# Patient Record
Sex: FEMALE | Race: WHITE
[De-identification: names, ages, dates, MRNs, and addresses within clinical notes are randomized per-mention and may not be internally consistent; named-entity substitution may affect disease eponyms.]

---

## 2018-12-23 ENCOUNTER — HOSPITAL ENCOUNTER (EMERGENCY)
Dept: HOSPITAL 94 - ER | Age: 48
Discharge: HOME | End: 2018-12-23
Payer: MEDICAID

## 2018-12-23 VITALS — WEIGHT: 182.98 LBS | HEIGHT: 60 IN | BODY MASS INDEX: 35.92 KG/M2

## 2018-12-23 VITALS — SYSTOLIC BLOOD PRESSURE: 129 MMHG | DIASTOLIC BLOOD PRESSURE: 76 MMHG
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
## 2018-12-23 DIAGNOSIS — Z79.899: ICD-10-CM
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
## 2018-12-23 DIAGNOSIS — S62.602B: Primary | ICD-10-CM

## 2018-12-23 DIAGNOSIS — I10: ICD-10-CM

## 2018-12-23 DIAGNOSIS — Z88.0: ICD-10-CM
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
## 2018-12-23 PROCEDURE — 29130 APPL FINGER SPLINT STATIC: CPT

## 2018-12-23 PROCEDURE — 99283 EMERGENCY DEPT VISIT LOW MDM: CPT

## 2018-12-23 PROCEDURE — 73110 X-RAY EXAM OF WRIST: CPT

## 2018-12-23 PROCEDURE — 73090 X-RAY EXAM OF FOREARM: CPT

## 2018-12-23 PROCEDURE — 73130 X-RAY EXAM OF HAND: CPT

## 2018-12-23 PROCEDURE — 12002 RPR S/N/AX/GEN/TRNK2.6-7.5CM: CPT

## 2022-06-10 ENCOUNTER — HOSPITAL ENCOUNTER (EMERGENCY)
Dept: HOSPITAL 94 - ER | Age: 52
Discharge: HOME | End: 2022-06-10
Payer: MEDICAID

## 2022-06-10 VITALS — SYSTOLIC BLOOD PRESSURE: 124 MMHG | DIASTOLIC BLOOD PRESSURE: 84 MMHG

## 2022-06-10 VITALS — BODY MASS INDEX: 34.19 KG/M2 | WEIGHT: 174.17 LBS | HEIGHT: 60 IN

## 2022-06-10 DIAGNOSIS — Z88.0: ICD-10-CM

## 2022-06-10 DIAGNOSIS — F15.90: ICD-10-CM
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
## 2022-06-10 DIAGNOSIS — Z79.899: ICD-10-CM

## 2022-06-10 DIAGNOSIS — M54.50: Primary | ICD-10-CM
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
## 2022-06-10 DIAGNOSIS — Z59.00: ICD-10-CM

## 2022-06-10 DIAGNOSIS — I10: ICD-10-CM

## 2022-06-10 PROCEDURE — 72131 CT LUMBAR SPINE W/O DYE: CPT

## 2022-06-10 PROCEDURE — 99284 EMERGENCY DEPT VISIT MOD MDM: CPT

## 2022-06-10 SDOH — ECONOMIC STABILITY - HOUSING INSECURITY: HOMELESSNESS UNSPECIFIED: Z59.00
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
## 2022-07-18 ENCOUNTER — HOSPITAL ENCOUNTER (EMERGENCY)
Dept: HOSPITAL 94 - ER | Age: 52
Discharge: HOME | End: 2022-07-18
Payer: MEDICAID

## 2022-07-18 VITALS — SYSTOLIC BLOOD PRESSURE: 144 MMHG | DIASTOLIC BLOOD PRESSURE: 94 MMHG

## 2022-07-18 VITALS — BODY MASS INDEX: 33.44 KG/M2 | HEIGHT: 60 IN | WEIGHT: 170.35 LBS

## 2022-07-18 DIAGNOSIS — F15.10: ICD-10-CM

## 2022-07-18 DIAGNOSIS — Z79.899: ICD-10-CM

## 2022-07-18 DIAGNOSIS — Z88.0: ICD-10-CM

## 2022-07-18 DIAGNOSIS — I10: ICD-10-CM

## 2022-07-18 DIAGNOSIS — Z59.00: ICD-10-CM

## 2022-07-18 DIAGNOSIS — M54.9: Primary | ICD-10-CM

## 2022-07-18 PROCEDURE — 99281 EMR DPT VST MAYX REQ PHY/QHP: CPT

## 2022-07-18 SDOH — ECONOMIC STABILITY - HOUSING INSECURITY: HOMELESSNESS UNSPECIFIED: Z59.00

## 2022-08-02 ENCOUNTER — HOSPITAL ENCOUNTER (EMERGENCY)
Dept: HOSPITAL 94 - ER | Age: 52
Discharge: LEFT BEFORE DISCHARGE | End: 2022-08-02
Payer: MEDICAID

## 2022-08-02 VITALS — WEIGHT: 150.31 LBS | BODY MASS INDEX: 29.51 KG/M2 | HEIGHT: 60 IN

## 2022-08-02 VITALS — DIASTOLIC BLOOD PRESSURE: 112 MMHG | SYSTOLIC BLOOD PRESSURE: 160 MMHG

## 2022-08-02 DIAGNOSIS — Z53.21: ICD-10-CM
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
## 2022-08-02 DIAGNOSIS — M25.522: Primary | ICD-10-CM
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
## 2022-09-10 ENCOUNTER — HOSPITAL ENCOUNTER (INPATIENT)
Dept: HOSPITAL 94 - ER | Age: 52
LOS: 1 days | Discharge: LEFT BEFORE DISCHARGE | DRG: 254 | End: 2022-09-11
Attending: HOSPITALIST | Admitting: FAMILY MEDICINE
Payer: MEDICAID

## 2022-09-10 VITALS — BODY MASS INDEX: 33.3 KG/M2 | HEIGHT: 61 IN | WEIGHT: 176.37 LBS

## 2022-09-10 DIAGNOSIS — B19.20: ICD-10-CM

## 2022-09-10 DIAGNOSIS — Z88.0: ICD-10-CM

## 2022-09-10 DIAGNOSIS — Z59.00: ICD-10-CM

## 2022-09-10 DIAGNOSIS — Z86.14: ICD-10-CM

## 2022-09-10 DIAGNOSIS — K61.1: Primary | ICD-10-CM

## 2022-09-10 DIAGNOSIS — E87.6: ICD-10-CM

## 2022-09-10 DIAGNOSIS — Z71.51: ICD-10-CM

## 2022-09-10 DIAGNOSIS — Z53.29: ICD-10-CM

## 2022-09-10 DIAGNOSIS — J45.909: ICD-10-CM

## 2022-09-10 DIAGNOSIS — I10: ICD-10-CM

## 2022-09-10 DIAGNOSIS — Z79.899: ICD-10-CM

## 2022-09-10 DIAGNOSIS — F32.A: ICD-10-CM

## 2022-09-10 DIAGNOSIS — K58.9: ICD-10-CM

## 2022-09-10 DIAGNOSIS — F15.129: ICD-10-CM

## 2022-09-10 DIAGNOSIS — K62.89: ICD-10-CM

## 2022-09-10 DIAGNOSIS — Z91.018: ICD-10-CM

## 2022-09-10 PROCEDURE — 85025 COMPLETE CBC W/AUTO DIFF WBC: CPT

## 2022-09-10 PROCEDURE — 83605 ASSAY OF LACTIC ACID: CPT

## 2022-09-10 PROCEDURE — 83735 ASSAY OF MAGNESIUM: CPT

## 2022-09-10 PROCEDURE — 80053 COMPREHEN METABOLIC PANEL: CPT

## 2022-09-10 PROCEDURE — 72193 CT PELVIS W/DYE: CPT

## 2022-09-10 PROCEDURE — 99285 EMERGENCY DEPT VISIT HI MDM: CPT

## 2022-09-10 PROCEDURE — 36415 COLL VENOUS BLD VENIPUNCTURE: CPT

## 2022-09-10 PROCEDURE — 84145 PROCALCITONIN (PCT): CPT

## 2022-09-10 PROCEDURE — 80320 DRUG SCREEN QUANTALCOHOLS: CPT

## 2022-09-10 PROCEDURE — 80305 DRUG TEST PRSMV DIR OPT OBS: CPT

## 2022-09-10 SDOH — ECONOMIC STABILITY - HOUSING INSECURITY: HOMELESSNESS UNSPECIFIED: Z59.00

## 2022-09-11 VITALS — SYSTOLIC BLOOD PRESSURE: 146 MMHG | DIASTOLIC BLOOD PRESSURE: 95 MMHG

## 2022-09-11 LAB
ALBUMIN SERPL BCP-MCNC: 2.6 G/DL (ref 3.4–5)
ALBUMIN/GLOB SERPL: 0.7 {RATIO} (ref 1.1–1.5)
ALP SERPL-CCNC: 35 IU/L (ref 46–116)
ALT SERPL W P-5'-P-CCNC: 29 U/L (ref 12–78)
AMPHETAMINES UR QL SCN: POSITIVE
ANION GAP SERPL CALCULATED.3IONS-SCNC: 8 MMOL/L (ref 8–16)
AST SERPL W P-5'-P-CCNC: 23 U/L (ref 10–37)
BARBITURATES UR QL SCN: NEGATIVE
BASOPHILS # BLD AUTO: 0 X10'3 (ref 0–0.2)
BASOPHILS NFR BLD AUTO: 0.2 % (ref 0–1)
BENZODIAZ UR QL SCN: NEGATIVE
BILIRUB SERPL-MCNC: 0.4 MG/DL (ref 0.1–1)
BUN SERPL-MCNC: 6 MG/DL (ref 7–18)
BUN/CREAT SERPL: 10.2 (ref 6.6–38)
BZE UR QL SCN: NEGATIVE
CALCIUM SERPL-MCNC: 7.9 MG/DL (ref 8.5–10.1)
CANNABINOIDS UR QL SCN: NEGATIVE
CHLORIDE SERPL-SCNC: 105 MMOL/L (ref 99–107)
CO2 SERPL-SCNC: 30.2 MMOL/L (ref 24–32)
CREAT SERPL-MCNC: 0.59 MG/DL (ref 0.4–0.9)
EOSINOPHIL # BLD AUTO: 0.2 X10'3 (ref 0–0.9)
EOSINOPHIL NFR BLD AUTO: 2.6 % (ref 0–6)
ERYTHROCYTE [DISTWIDTH] IN BLOOD BY AUTOMATED COUNT: 14.1 % (ref 11.5–14.5)
ETHANOL SERPL-MCNC: < 0.01 GM/DL (ref 0–0.01)
GFR SERPL CREATININE-BSD FRML MDRD: > 90 ML/MIN
GLUCOSE SERPL-MCNC: 105 MG/DL (ref 70–104)
HCT VFR BLD AUTO: 34.2 % (ref 35–45)
HGB BLD-MCNC: 11.8 G/DL (ref 12–16)
LYMPHOCYTES # BLD AUTO: 2.4 X10'3 (ref 1.1–4.8)
LYMPHOCYTES NFR BLD AUTO: 25 % (ref 21–51)
MAGNESIUM SERPL-MCNC: 1.9 MG/DL (ref 1.5–2.4)
MCH RBC QN AUTO: 28.1 PG (ref 27–31)
MCHC RBC AUTO-ENTMCNC: 34.6 G/DL (ref 33–36.5)
MCV RBC AUTO: 81.3 FL (ref 78–98)
METHADONE UR QL SCN: NEGATIVE
MONOCYTES # BLD AUTO: 0.9 X10'3 (ref 0–0.9)
MONOCYTES NFR BLD AUTO: 9.5 % (ref 2–12)
NEUTROPHILS # BLD AUTO: 6 X10'3 (ref 1.8–7.7)
NEUTROPHILS NFR BLD AUTO: 62.7 % (ref 42–75)
OPIATES UR QL SCN: NEGATIVE
PCP UR QL SCN: NEGATIVE
PLATELET # BLD AUTO: 200 X10'3 (ref 140–440)
PMV BLD AUTO: 8.4 FL (ref 7.4–10.4)
POTASSIUM SERPL-SCNC: 2.6 MMOL/L (ref 3.5–5.1)
PROT SERPL-MCNC: 6.2 G/DL (ref 6.4–8.2)
RBC # BLD AUTO: 4.2 X10'6 (ref 4.2–5.6)
SODIUM SERPL-SCNC: 143 MMOL/L (ref 135–145)
WBC # BLD AUTO: 9.6 X10'3 (ref 4.5–11)

## 2022-09-13 ENCOUNTER — HOSPITAL ENCOUNTER (EMERGENCY)
Dept: HOSPITAL 94 - ER | Age: 52
Discharge: LEFT BEFORE DISCHARGE | End: 2022-09-13
Payer: MEDICAID

## 2022-09-13 VITALS — DIASTOLIC BLOOD PRESSURE: 89 MMHG | SYSTOLIC BLOOD PRESSURE: 136 MMHG

## 2022-09-13 VITALS — HEIGHT: 61 IN | BODY MASS INDEX: 35.95 KG/M2 | WEIGHT: 190.39 LBS

## 2022-09-13 DIAGNOSIS — K61.1: Primary | ICD-10-CM

## 2022-09-13 DIAGNOSIS — Z88.0: ICD-10-CM

## 2022-09-13 DIAGNOSIS — Z86.14: ICD-10-CM

## 2022-09-13 DIAGNOSIS — Z79.1: ICD-10-CM

## 2022-09-13 DIAGNOSIS — Z79.899: ICD-10-CM

## 2022-09-13 DIAGNOSIS — Z59.00: ICD-10-CM

## 2022-09-13 DIAGNOSIS — I10: ICD-10-CM

## 2022-09-13 DIAGNOSIS — F15.10: ICD-10-CM

## 2022-09-13 PROCEDURE — 99283 EMERGENCY DEPT VISIT LOW MDM: CPT

## 2022-09-13 SDOH — ECONOMIC STABILITY - HOUSING INSECURITY: HOMELESSNESS UNSPECIFIED: Z59.00

## 2023-02-08 ENCOUNTER — HOSPITAL ENCOUNTER (EMERGENCY)
Dept: HOSPITAL 94 - ER | Age: 53
Discharge: HOME | End: 2023-02-08
Payer: MEDICAID

## 2023-02-08 VITALS — DIASTOLIC BLOOD PRESSURE: 98 MMHG | SYSTOLIC BLOOD PRESSURE: 152 MMHG

## 2023-02-08 VITALS — HEIGHT: 61 IN | BODY MASS INDEX: 32.16 KG/M2 | WEIGHT: 170.35 LBS

## 2023-02-08 DIAGNOSIS — Z79.899: ICD-10-CM

## 2023-02-08 DIAGNOSIS — Z59.00: ICD-10-CM

## 2023-02-08 DIAGNOSIS — F15.90: ICD-10-CM

## 2023-02-08 DIAGNOSIS — I10: ICD-10-CM

## 2023-02-08 DIAGNOSIS — G89.29: ICD-10-CM

## 2023-02-08 DIAGNOSIS — M54.50: Primary | ICD-10-CM

## 2023-02-08 DIAGNOSIS — Z88.0: ICD-10-CM

## 2023-02-08 DIAGNOSIS — Z86.14: ICD-10-CM

## 2023-02-08 PROCEDURE — 99284 EMERGENCY DEPT VISIT MOD MDM: CPT

## 2023-02-08 PROCEDURE — 96372 THER/PROPH/DIAG INJ SC/IM: CPT

## 2023-02-08 SDOH — ECONOMIC STABILITY - HOUSING INSECURITY: HOMELESSNESS UNSPECIFIED: Z59.00

## 2023-02-18 ENCOUNTER — HOSPITAL ENCOUNTER (EMERGENCY)
Dept: HOSPITAL 94 - ER | Age: 53
LOS: 1 days | Discharge: LEFT BEFORE DISCHARGE | End: 2023-02-19
Payer: MEDICAID

## 2023-02-18 VITALS — HEIGHT: 60 IN | BODY MASS INDEX: 33.41 KG/M2 | WEIGHT: 170.2 LBS

## 2023-02-18 VITALS — SYSTOLIC BLOOD PRESSURE: 161 MMHG | DIASTOLIC BLOOD PRESSURE: 98 MMHG

## 2023-02-18 DIAGNOSIS — M25.552: Primary | ICD-10-CM

## 2023-02-18 DIAGNOSIS — Z53.21: ICD-10-CM

## 2023-02-18 PROCEDURE — 73502 X-RAY EXAM HIP UNI 2-3 VIEWS: CPT
